# Patient Record
Sex: FEMALE | Race: BLACK OR AFRICAN AMERICAN | NOT HISPANIC OR LATINO | Employment: FULL TIME | URBAN - METROPOLITAN AREA
[De-identification: names, ages, dates, MRNs, and addresses within clinical notes are randomized per-mention and may not be internally consistent; named-entity substitution may affect disease eponyms.]

---

## 2024-11-16 ENCOUNTER — HOSPITAL ENCOUNTER (EMERGENCY)
Facility: HOSPITAL | Age: 24
Discharge: HOME | End: 2024-11-16
Attending: EMERGENCY MEDICINE

## 2024-11-16 VITALS
DIASTOLIC BLOOD PRESSURE: 72 MMHG | TEMPERATURE: 97.9 F | HEART RATE: 80 BPM | HEIGHT: 63 IN | BODY MASS INDEX: 21.44 KG/M2 | WEIGHT: 121 LBS | SYSTOLIC BLOOD PRESSURE: 118 MMHG | RESPIRATION RATE: 18 BRPM | OXYGEN SATURATION: 99 %

## 2024-11-16 DIAGNOSIS — N90.89 CLITORAL IRRITATION: Primary | ICD-10-CM

## 2024-11-16 PROCEDURE — 99283 EMERGENCY DEPT VISIT LOW MDM: CPT

## 2024-11-16 PROCEDURE — 2500000002 HC RX 250 W HCPCS SELF ADMINISTERED DRUGS (ALT 637 FOR MEDICARE OP, ALT 636 FOR OP/ED)

## 2024-11-16 RX ORDER — SULFAMETHOXAZOLE AND TRIMETHOPRIM 800; 160 MG/1; MG/1
1 TABLET ORAL ONCE
Status: COMPLETED | OUTPATIENT
Start: 2024-11-16 | End: 2024-11-16

## 2024-11-16 RX ORDER — SULFAMETHOXAZOLE AND TRIMETHOPRIM 800; 160 MG/1; MG/1
1 TABLET ORAL EVERY 12 HOURS
Qty: 10 TABLET | Refills: 0 | Status: SHIPPED | OUTPATIENT
Start: 2024-11-16 | End: 2024-11-21

## 2024-11-16 RX ADMIN — SULFAMETHOXAZOLE AND TRIMETHOPRIM 1 TABLET: 800; 160 TABLET ORAL at 15:11

## 2024-11-16 ASSESSMENT — PAIN DESCRIPTION - FREQUENCY: FREQUENCY: CONSTANT/CONTINUOUS

## 2024-11-16 ASSESSMENT — PAIN DESCRIPTION - ONSET: ONSET: GRADUAL

## 2024-11-16 ASSESSMENT — PAIN DESCRIPTION - PROGRESSION: CLINICAL_PROGRESSION: GRADUALLY WORSENING

## 2024-11-16 ASSESSMENT — PAIN DESCRIPTION - LOCATION: LOCATION: VAGINA

## 2024-11-16 ASSESSMENT — COLUMBIA-SUICIDE SEVERITY RATING SCALE - C-SSRS
2. HAVE YOU ACTUALLY HAD ANY THOUGHTS OF KILLING YOURSELF?: NO
1. IN THE PAST MONTH, HAVE YOU WISHED YOU WERE DEAD OR WISHED YOU COULD GO TO SLEEP AND NOT WAKE UP?: NO
6. HAVE YOU EVER DONE ANYTHING, STARTED TO DO ANYTHING, OR PREPARED TO DO ANYTHING TO END YOUR LIFE?: NO

## 2024-11-16 ASSESSMENT — PAIN - FUNCTIONAL ASSESSMENT: PAIN_FUNCTIONAL_ASSESSMENT: 0-10

## 2024-11-16 ASSESSMENT — PAIN DESCRIPTION - DESCRIPTORS: DESCRIPTORS: THROBBING;PRESSURE

## 2024-11-16 ASSESSMENT — PAIN DESCRIPTION - ORIENTATION: ORIENTATION: RIGHT

## 2024-11-16 ASSESSMENT — PAIN SCALES - GENERAL: PAINLEVEL_OUTOF10: 7

## 2024-11-16 ASSESSMENT — PAIN DESCRIPTION - PAIN TYPE: TYPE: ACUTE PAIN

## 2024-11-16 NOTE — ED TRIAGE NOTES
"Patient presents to ED from home for a lump in her vaginal area for 2 days that causes pain. Patient attempted to squeeze the abscess and felt a \"pop\" but nothing came out.   "

## 2024-11-16 NOTE — ED PROVIDER NOTES
HPI   Chief Complaint   Patient presents with    Abscess       24-year-old female otherwise healthy presents the ED today with a chief concern of vaginal pain.  Patient reports symptoms started about 2 days ago.  She reports the pain is located in the right clitoral area.  She reports that she popped it and tried to squeeze it however only a little bit of clear fluid came out.  She denies any fevers.  She denies any nausea or vomiting.  She denies any chest pain or shortness of breath.  She denies any abdominal pain.  She adamantly denies any chance of pregnancy.  Was sexually active a couple months ago.  Denies urinary symptoms.  She has no other symptoms or concerns at this time.      History provided by:  Patient   used: No            Patient History   No past medical history on file.  No past surgical history on file.  No family history on file.  Social History     Tobacco Use    Smoking status: Not on file    Smokeless tobacco: Not on file   Substance Use Topics    Alcohol use: Not on file    Drug use: Not on file       Physical Exam   ED Triage Vitals [11/16/24 1359]   Temperature Heart Rate Respirations BP   36.6 °C (97.9 °F) 79 18 124/87      Pulse Ox Temp Source Heart Rate Source Patient Position   100 % Temporal Monitor Sitting      BP Location FiO2 (%)     Right arm --       Physical Exam  Constitutional: Vital signs per nursing notes.  Well developed, well nourished.  No acute distress.    Eyes: onjunctivae and lids normal  ENT: Ears normal externally; face symmetric. voice normal  Neck: neck supple, no meningismus; trachea midline without deviation  Respiratory: normal respiratory effort and excursion; no rales, rhonchi, or wheezes; equal air entry  Cardiovascular: RRR, 2+ pulses extremities   Neurological: normal speech; CN II-XII grossly intact, normal motor and sensory function  GI: no distention, soft, nontender  : There is a dime to quarter sized area of erythema on the right  side of the clitoris.  There is no surrounding erythema.  There is no lymphangitic streaking.  There is no crepitus.  No obvious abscess.  No Bartholin cyst or abscess.  Small amount of white vaginal discharge.  No cervical motion tenderness.  Musculoskeletal: normal gait and station; normal digits and nails; normal to palpation; normal strength/tone; neurovascular status intact.  Skin: normal to inspection; normal to palpation; no rash      ED Course & MDM   ED Course as of 11/16/24 1504   Sat Nov 16, 2024   1500 Spoke with Dr. Bose from GYN who states we can start the patient on Bactrim and have her follow-up in the clinic outpatient [MC]      ED Course User Index  [MC] Koby Serna PA-C         Diagnoses as of 11/16/24 1504   Clitoral irritation                 No data recorded     [place] Coma Scale Score: 15 (11/16/24 1401 : Yani Griffin RN)                           Medical Decision Making  24-year-old female otherwise healthy presents the ED today with a chief concern of vaginal pain.  Vital signs reassuring.  Patient overall appears well and is nontoxic-appearing.  Was given Bactrim in the ED.  Spoke with gynecology as noted above.  They recommended starting patient on Bactrim.  She was given her first dose in the ED and I sent the rest of the antibiotic to her pharmacy.  Her examination is concerning for clitoral acevedo abscess versus cellulitis.  Will not drain anything at this time.  Will have patient see gynecology outpatient and started on Bactrim.  Discussed uses and possible side effects of this medication.  I have low suspicion for any deep space infection at this time.  Do not think further white blood work or imaging is indicated at this time.  She is not having any systemic signs or symptoms.  The area does not resemble Kirstie gangrene.  Discussed my impression and findings with patient she feels comfortable returning home.  We discussed very strict return precautions including returning  for any new or worsening signs or symptoms.  Patient is in agreement this plan.  She will follow-up with her PCP and gynecology within 3 days.  Again discussed return to precautions.  Patient was also seen and evaluated by attending physician.    Differential diagnosis: Clitoral acevedo cyst, abscess, Bartholin cyst, cellulitis, lymphangitis, necrotizing fasciitis    Disposition/treatment  1.  See diagnosis    Shared decision-making was used patient feels comfortable returning home     Patient was advised to follow up with recommended provider in 1 day1 for another evaluation and exam. I advised patient/guardian to return or go to closest emergency room immediately if symptoms change, get worse, new symptoms develop prior to follow up. If there is no improvement in symptoms in the next 24 hours they are advised to return for further evaluation and exam. I also explained the plan and treatment course. Patient/guardian is in agreement with plan, treatment course, and follow up and states verbally that they will comply.    Homegoing. I discussed the differential; results and discharge plan with the patient and/or family/friend/caregiver if present.  I emphasized the importance of follow-up with the physician I referred them to in the timeframe recommended.  I explained reasons for the patient to return to the Emergency Department. They agreed that if they feel their condition is worsening or if they have any other concern they should call 911 immediately for further assistance. I gave the patient an opportunity to ask all questions they had and answered all of them accordingly. They understand return precautions and discharge instructions. The patient and/or family/friend/caregiver expressed understanding verbally and that they would comply.        This note has been transcribed using voice recognition and may contain grammatical errors, misplaced words, incorrect words, incorrect phrases or other  errors.        Procedure  Procedures     Koby Serna PA-C  11/16/24 2700

## 2024-11-16 NOTE — DISCHARGE INSTRUCTIONS
Please return to the ED immediately if you have any new or worsening signs or symptoms  Please follow-up with your primary care doctor and gynecology within 3 days

## 2024-11-22 ENCOUNTER — APPOINTMENT (OUTPATIENT)
Dept: OBSTETRICS AND GYNECOLOGY | Facility: CLINIC | Age: 24
End: 2024-11-22

## 2024-12-04 ENCOUNTER — APPOINTMENT (OUTPATIENT)
Dept: OBSTETRICS AND GYNECOLOGY | Facility: CLINIC | Age: 24
End: 2024-12-04

## 2024-12-11 ENCOUNTER — APPOINTMENT (OUTPATIENT)
Dept: OBSTETRICS AND GYNECOLOGY | Facility: CLINIC | Age: 24
End: 2024-12-11

## 2024-12-18 ENCOUNTER — APPOINTMENT (OUTPATIENT)
Dept: OBSTETRICS AND GYNECOLOGY | Facility: CLINIC | Age: 24
End: 2024-12-18

## 2024-12-31 ENCOUNTER — HOSPITAL ENCOUNTER (EMERGENCY)
Facility: HOSPITAL | Age: 24
Discharge: HOME | End: 2024-12-31
Payer: MEDICAID

## 2024-12-31 VITALS
HEART RATE: 86 BPM | TEMPERATURE: 97.5 F | DIASTOLIC BLOOD PRESSURE: 91 MMHG | SYSTOLIC BLOOD PRESSURE: 129 MMHG | WEIGHT: 122 LBS | BODY MASS INDEX: 21.62 KG/M2 | RESPIRATION RATE: 18 BRPM | OXYGEN SATURATION: 100 % | HEIGHT: 63 IN

## 2024-12-31 DIAGNOSIS — N76.4 ABSCESS OF RIGHT GENITAL LABIA: Primary | ICD-10-CM

## 2024-12-31 PROCEDURE — 56405 I&D VULVA/PERINEAL ABSCESS: CPT

## 2024-12-31 PROCEDURE — 99283 EMERGENCY DEPT VISIT LOW MDM: CPT | Mod: 25

## 2024-12-31 RX ORDER — CEPHALEXIN 500 MG/1
500 CAPSULE ORAL 3 TIMES DAILY
Qty: 21 CAPSULE | Refills: 0 | Status: SHIPPED | OUTPATIENT
Start: 2024-12-31 | End: 2025-01-07

## 2024-12-31 RX ORDER — SULFAMETHOXAZOLE AND TRIMETHOPRIM 800; 160 MG/1; MG/1
1 TABLET ORAL 2 TIMES DAILY
Qty: 14 TABLET | Refills: 0 | Status: SHIPPED | OUTPATIENT
Start: 2024-12-31 | End: 2025-01-07

## 2024-12-31 ASSESSMENT — COLUMBIA-SUICIDE SEVERITY RATING SCALE - C-SSRS
2. HAVE YOU ACTUALLY HAD ANY THOUGHTS OF KILLING YOURSELF?: NO
6. HAVE YOU EVER DONE ANYTHING, STARTED TO DO ANYTHING, OR PREPARED TO DO ANYTHING TO END YOUR LIFE?: NO
1. IN THE PAST MONTH, HAVE YOU WISHED YOU WERE DEAD OR WISHED YOU COULD GO TO SLEEP AND NOT WAKE UP?: NO

## 2024-12-31 ASSESSMENT — PAIN DESCRIPTION - PAIN TYPE: TYPE: ACUTE PAIN

## 2024-12-31 ASSESSMENT — PAIN - FUNCTIONAL ASSESSMENT: PAIN_FUNCTIONAL_ASSESSMENT: 0-10

## 2024-12-31 ASSESSMENT — PAIN SCALES - GENERAL: PAINLEVEL_OUTOF10: 7

## 2024-12-31 ASSESSMENT — PAIN DESCRIPTION - LOCATION: LOCATION: VAGINA

## 2024-12-31 NOTE — ED PROVIDER NOTES
HPI   Chief Complaint   Patient presents with    Cyst       History of present illness: 24-year-old female complains of a cyst in her right groin for the last few days.  Patient states that she normally gets a pimple on her skin when her menstrual cycle begins.  Patient states there is increased swelling and pus at that area draining.  Patient had a similar issue on her vagina in November 2024 on her clitoris.  Denies abdominal pain nausea vomiting fevers chills sweats lightheadedness dizziness.  Patient states that she recently shaved her vagina.    Review of systems: Constitutional, eye, ENT, cardiovascular, respiratory, gastrointestinal, genitourinary, neurologic, musculoskeletal, dermatologic, hematologic, endocrine systems were evaluated and were negative unless otherwise specified in history of present illness.    Medications: Reviewed and per nursing note.    Family history: Denies relevant medical conditions.    Past medical history: None per patient    Social history: Denies tobacco, alcohol, drug use.      Physical exam:    Appearance: Well-developed, well-nourished, nontoxic-appearing, alert and oriented x3. Talking in complete sentences.    HEENT:  Head normocephalic atraumatic, extraocular movements intact, pupils equal round reactive to light, mucous membranes are moist and pink.    NECK:  Nml Inspection, no meningismus, no thyromegaly, no lymphadenopathy, no JVD, trachea is midline.    Respiratory: Clear to auscultation bilaterally with normal bilateral excursion. No wheezes, rhonchi, crackles.    Cardiovascular: Regular rate and rhythm, no murmurs, rubs or gallops. Pulses 2+ symmetrically in the dorsalis pedis and radial pulses.    Abdomen/GI:  Soft, nontender, nondistended, normal bowel sounds x4. No masses or organomegaly.    :  No CVA tenderness    Genital: 1 cm indurated lesion with minimal fluctuance on right labia with right inguinal lymphadenopathy it is mobile and tender.  Internal exam  deferred.    Neuro:  Oriented x 3, Speech Clear, cranial nerves grossly intact. Normal sensation to light touch in all 4 extremities.    Musculoskeletal: Patient spontaneously moves all 4 extremities.    Skin:  No cyanosis, clubbing, edema, open wounds, or rashes.            Patient History   Past Medical History:   Diagnosis Date    Asthma      History reviewed. No pertinent surgical history.  No family history on file.  Social History     Tobacco Use    Smoking status: Never    Smokeless tobacco: Never   Substance Use Topics    Alcohol use: Not on file    Drug use: Not on file       Physical Exam   ED Triage Vitals [12/31/24 1353]   Temperature Heart Rate Respirations BP   36.4 °C (97.5 °F) 86 18 (!) 129/91      Pulse Ox Temp Source Heart Rate Source Patient Position   100 % Oral Monitor Sitting      BP Location FiO2 (%)     Right arm --       Physical Exam      ED Course & MDM   Diagnoses as of 12/31/24 1533   Abscess of right genital labia                 No data recorded                                 Medical Decision Making  24-year-old female with pain in right labia.  Differential diagnosis of abscess cellulitis herpes lymphadenopathy cervicitis PID.  Examination shows right labial abscess.  Patient was previously squeezing and draining it.  Minimal fluctuance.  Patient was given option of drainage and she requested needle aspiration drainage.  This was performed with limited removal of purulent bloody fluid.  Patient afebrile nontoxic-appearing without evidence of complication such as sepsis or PID.  Patient will be treated with Bactrim Keflex for abscess.    Incision and drainage:    Abscess incision and drainage was performed by physician assistant. Site was cleansed with alcohol swab.  18-gauge needle infiltrated the primary site of fluctuance at the abscess site.  With aspiration minimal amount of bloody purulent fluid was removed with subsequent pressure.  Dry sterile dressing was applied. Patient  tolerated procedure well.    Patient will be discharged to home with prescription for Bactrim Keflex.  Patient is educated in signs and symptoms of worsening symptoms and reasons to come back to the emergency department.  Will need to follow up with primary care provider.  Patient does not report social determinants of health impacting ability to obtain care that is needed.  Patient agrees with plan.    This is a transcription.  Text was reviewed for errors, but some transcription errors may remain.  Please call for any questions.          Procedure  Procedures     Nolberto Castañeda PA-C  12/31/24 1537

## 2025-01-08 ENCOUNTER — APPOINTMENT (OUTPATIENT)
Dept: OBSTETRICS AND GYNECOLOGY | Facility: CLINIC | Age: 25
End: 2025-01-08

## 2025-01-15 ENCOUNTER — APPOINTMENT (OUTPATIENT)
Dept: OBSTETRICS AND GYNECOLOGY | Facility: CLINIC | Age: 25
End: 2025-01-15
Payer: MEDICAID

## 2025-01-24 ENCOUNTER — APPOINTMENT (OUTPATIENT)
Dept: OBSTETRICS AND GYNECOLOGY | Facility: CLINIC | Age: 25
End: 2025-01-24
Payer: MEDICAID

## 2025-01-24 VITALS — DIASTOLIC BLOOD PRESSURE: 78 MMHG | SYSTOLIC BLOOD PRESSURE: 115 MMHG

## 2025-01-24 DIAGNOSIS — N76.4 VULVAR ABSCESS: ICD-10-CM

## 2025-01-24 DIAGNOSIS — Z11.3 SCREEN FOR STD (SEXUALLY TRANSMITTED DISEASE): Primary | ICD-10-CM

## 2025-01-24 DIAGNOSIS — Z12.4 ENCOUNTER FOR PAPANICOLAOU SMEAR OF CERVIX: ICD-10-CM

## 2025-01-24 PROCEDURE — 88175 CYTOPATH C/V AUTO FLUID REDO: CPT

## 2025-01-24 PROCEDURE — 87491 CHLMYD TRACH DNA AMP PROBE: CPT

## 2025-01-24 PROCEDURE — 87205 SMEAR GRAM STAIN: CPT

## 2025-01-24 PROCEDURE — 87075 CULTR BACTERIA EXCEPT BLOOD: CPT

## 2025-01-24 PROCEDURE — 87661 TRICHOMONAS VAGINALIS AMPLIF: CPT

## 2025-01-24 PROCEDURE — 87591 N.GONORRHOEAE DNA AMP PROB: CPT

## 2025-01-24 PROCEDURE — 87185 SC STD ENZYME DETCJ PER NZM: CPT

## 2025-01-24 PROCEDURE — 87070 CULTURE OTHR SPECIMN AEROBIC: CPT

## 2025-01-24 RX ORDER — ALBUTEROL SULFATE 90 UG/1
2 INHALANT RESPIRATORY (INHALATION) 4 TIMES DAILY
COMMUNITY
Start: 2016-11-05

## 2025-01-24 RX ORDER — LIDOCAINE HYDROCHLORIDE 20 MG/ML
3 INJECTION, SOLUTION INFILTRATION; PERINEURAL ONCE
Status: COMPLETED | OUTPATIENT
Start: 2025-01-24 | End: 2025-01-24

## 2025-01-24 RX ORDER — SULFAMETHOXAZOLE AND TRIMETHOPRIM 800; 160 MG/1; MG/1
1 TABLET ORAL 2 TIMES DAILY
Qty: 10 TABLET | Refills: 0 | Status: SHIPPED | OUTPATIENT
Start: 2025-01-24 | End: 2025-01-31

## 2025-01-24 RX ADMIN — LIDOCAINE HYDROCHLORIDE 3 ML: 20 INJECTION, SOLUTION INFILTRATION; PERINEURAL at 09:41

## 2025-01-24 NOTE — PROGRESS NOTES
"Subjective   Patient ID: Celestine Hagan \"Elli" is a 24 y.o. female who presents for New Patient Visit (Patient was seen in ED 12/31/24 for boil on right side pelvis area. The doctor gave her medication that was taken until she ran out. Patient states that since she's taken medication and used heating pad the boil went down. The boil is small but still has yellow/clear fluid coming out of it. ).  Here for ED follow-up after I&D for right vulvar abscess.  Patient was sent out with Keflex and Bactrim antibiotics x 7 days.  Denies fevers this pain is improved substantially does report that she is having some drainage today        Review of Systems   All other systems reviewed and are negative.      Objective   Physical Exam  Vitals reviewed. Exam conducted with a chaperone present.   Constitutional:       General: She is not in acute distress.     Appearance: She is not ill-appearing.   Pulmonary:      Effort: Pulmonary effort is normal.   Genitourinary:     General: Normal vulva.      Exam position: Lithotomy position.      Vagina: No signs of injury and foreign body. No vaginal discharge, erythema, tenderness, bleeding, lesions or prolapsed vaginal walls.      Cervix: No discharge, friability, lesion, erythema or cervical bleeding.          Comments: 1 to 2 cm over lesion deep to the skin in the right mons    Well-healing I&D site on the right labia  Skin:     Coloration: Skin is not pale.   Neurological:      Mental Status: She is alert.         Assessment/Plan   Diagnoses and all orders for this visit:  Vulvar abscess  -     Referral to Gynecology  -     THINPREP PAP TEST  -     C. trachomatis / N. gonorrhoeae, Amplified  -     Trichomonas vaginalis, Amplified  -     Tissue/Wound Culture/Smear  -     sulfamethoxazole-trimethoprim (Bactrim DS) 800-160 mg tablet; Take 1 tablet by mouth 2 times a day for 7 days.    Patient here for ED follow-up after I&D for right vulvar abscess.  Patient still with significant " "vulvar lesion consistent abscess I&D performed as below.  Speculum exam performed to screen for sexually transmitted infections and performed Pap smear.  Will send prescription for Bactrim.  Patient to follow-up as needed    Patient ID: Celestine Hagan \"Patricio\" is a 24 y.o. female.    Procedures       Denilson Daniel MD 01/24/25 9:28 AM   "

## 2025-01-24 NOTE — PROGRESS NOTES
"Patient ID: Celestine Hagan \"Elli" is a 24 y.o. female.    Procedures    Incision and drainage    Area was prepped with Betadine and abscess was anesthetized using 2% plain lidocaine.  A 5 mm linear incision was made over the abscess and minimal bloody fluid drained.  Wound culture was obtained.  The wound was loculations were broken up.  Pressure and gauze were applied.  "

## 2025-01-25 LAB
C TRACH RRNA SPEC QL NAA+PROBE: NEGATIVE
N GONORRHOEA DNA SPEC QL PROBE+SIG AMP: NEGATIVE
T VAGINALIS RRNA SPEC QL NAA+PROBE: NEGATIVE

## 2025-01-26 LAB
B-LACTAMASE ORGANISM ISLT: POSITIVE
BACTERIA SPEC CULT: NORMAL
BACTERIA SPEC CULT: NORMAL
GRAM STN SPEC: NORMAL
GRAM STN SPEC: NORMAL

## 2025-02-05 LAB
CYTOLOGY CMNT CVX/VAG CYTO-IMP: NORMAL
LAB AP HPV GENOTYPE QUESTION: YES
LAB AP HPV HR: NORMAL
LABORATORY COMMENT REPORT: NORMAL
LMP START DATE: NORMAL
PATH REPORT.TOTAL CANCER: NORMAL

## 2025-02-06 ENCOUNTER — APPOINTMENT (OUTPATIENT)
Dept: PRIMARY CARE | Facility: CLINIC | Age: 25
End: 2025-02-06

## 2025-02-14 ENCOUNTER — HOSPITAL ENCOUNTER (EMERGENCY)
Facility: HOSPITAL | Age: 25
Discharge: HOME | End: 2025-02-14
Payer: MEDICAID

## 2025-02-14 VITALS
DIASTOLIC BLOOD PRESSURE: 93 MMHG | TEMPERATURE: 97.8 F | RESPIRATION RATE: 16 BRPM | HEART RATE: 72 BPM | WEIGHT: 125 LBS | BODY MASS INDEX: 22.15 KG/M2 | SYSTOLIC BLOOD PRESSURE: 119 MMHG | HEIGHT: 63 IN | OXYGEN SATURATION: 100 %

## 2025-02-14 DIAGNOSIS — T15.91XA FOREIGN BODY, EYE, RIGHT, INITIAL ENCOUNTER: ICD-10-CM

## 2025-02-14 DIAGNOSIS — H11.421 CHEMOSIS OF CONJUNCTIVA, RIGHT: Primary | ICD-10-CM

## 2025-02-14 PROCEDURE — 2500000005 HC RX 250 GENERAL PHARMACY W/O HCPCS: Performed by: NURSE PRACTITIONER

## 2025-02-14 PROCEDURE — 99283 EMERGENCY DEPT VISIT LOW MDM: CPT

## 2025-02-14 RX ORDER — ERYTHROMYCIN 5 MG/G
OINTMENT OPHTHALMIC NIGHTLY
Qty: 1 G | Refills: 0 | Status: SHIPPED | OUTPATIENT
Start: 2025-02-14 | End: 2025-02-21

## 2025-02-14 RX ADMIN — FLUORESCEIN SODIUM 1 STRIP: 1 STRIP OPHTHALMIC at 10:18

## 2025-02-14 ASSESSMENT — VISUAL ACUITY
OS: 20/25
OD: 20/25
OU: 20/25

## 2025-02-14 ASSESSMENT — PAIN SCALES - GENERAL: PAINLEVEL_OUTOF10: 1

## 2025-02-14 ASSESSMENT — PAIN - FUNCTIONAL ASSESSMENT: PAIN_FUNCTIONAL_ASSESSMENT: 0-10

## 2025-02-14 ASSESSMENT — TONOMETRY
OD_IOP_MMHG: 16
IOP_HANDHELD: 1

## 2025-02-14 ASSESSMENT — COLUMBIA-SUICIDE SEVERITY RATING SCALE - C-SSRS
6. HAVE YOU EVER DONE ANYTHING, STARTED TO DO ANYTHING, OR PREPARED TO DO ANYTHING TO END YOUR LIFE?: NO
1. IN THE PAST MONTH, HAVE YOU WISHED YOU WERE DEAD OR WISHED YOU COULD GO TO SLEEP AND NOT WAKE UP?: NO
2. HAVE YOU ACTUALLY HAD ANY THOUGHTS OF KILLING YOURSELF?: NO

## 2025-02-14 NOTE — ED TRIAGE NOTES
Pt states she has a foreign object in her eye. Pt's eye is very red and swollen. Pt is awake, alert, and ambulatory upon arrival.

## 2025-02-14 NOTE — Clinical Note
JoseJakob Hagan was seen and treated in our emergency department on 2/14/2025.  She may return to work on 02/15/2025.       If you have any questions or concerns, please don't hesitate to call.      Jennifer Henry V, APRN-CNP

## 2025-02-14 NOTE — ED PROVIDER NOTES
"HPI     CC: Foreign Body in Eye     HPI: Celestine Hagan is a 24 y.o. female with past medical history of asthma presents with complaints of right eye discomfort that started around 8 AM this morning.  She endorses associated redness and swelling.  She reports her cat was sitting in the windowsill when he jumped off something flew off of his paws and felt like it hit her in the eye.  She denies sensation of sand or grit in the eye.  She denies pain.  She denies change in vision.  She denies drainage.  Denies fevers or chills.    ROS: 10-point review of systems was performed and is otherwise negative except as noted in HPI.      Past Medical History: Noncontributory except per HPI     Past Surgical History: Noncontributory except per HPI     Family History: Reviewed and noncontributory     Social History:  Noncontributory except per HPI       No Known Allergies    Past Medical History:   Diagnosis Date    Asthma        Home Meds:   Current Outpatient Medications   Medication Instructions    albuterol (ProAir HFA) 90 mcg/actuation inhaler 2 puffs, 4 times daily    erythromycin (Romycin) 5 mg/gram (0.5 %) ophthalmic ointment Right Eye, Nightly, Apply Amount per Dose: 0.5 inch (~1 cm) per dose.        ED Triage Vitals [02/14/25 0909]   Temperature Heart Rate Respirations BP   36.6 °C (97.8 °F) 72 16 (!) 119/93      Pulse Ox Temp src Heart Rate Source Patient Position   100 % -- -- --      BP Location FiO2 (%)     -- --         Heart Rate:  [72]   Temperature:  [36.6 °C (97.8 °F)]   Respirations:  [16]   BP: (119)/(93)   Height:  [160 cm (5' 3\")]   Weight:  [56.7 kg (125 lb)]   Pulse Ox:  [100 %]      Physical Exam:  Physical Exam  Vitals and nursing note reviewed.   Constitutional:       General: She is not in acute distress.     Appearance: She is well-developed.   HENT:      Head: Normocephalic and atraumatic.   Eyes:      General:         Right eye: No foreign body or discharge.         Left eye: Discharge " present.No foreign body.      Intraocular pressure: Right eye pressure is 16 mmHg. Measurements were taken using a handheld tonometer.     Extraocular Movements: Extraocular movements intact.      Conjunctiva/sclera: Conjunctivae normal.      Right eye: Chemosis present. No exudate.  Cardiovascular:      Rate and Rhythm: Normal rate and regular rhythm.      Heart sounds: No murmur heard.  Pulmonary:      Effort: Pulmonary effort is normal. No respiratory distress.      Breath sounds: Normal breath sounds.   Abdominal:      Palpations: Abdomen is soft.      Tenderness: There is no abdominal tenderness.   Musculoskeletal:         General: No swelling.      Cervical back: Neck supple.   Skin:     General: Skin is warm and dry.      Capillary Refill: Capillary refill takes less than 2 seconds.   Neurological:      Mental Status: She is alert.   Psychiatric:         Mood and Affect: Mood normal.          Diagnostic Results        Labs Reviewed - No data to display      No orders to display                 [place] Coma Scale Score: 15                  Procedure  Procedures    ED Course & MDM   Assessment/Plan:     Medications   fluorescein 1 mg ophthalmic strip 1 strip (1 strip Right Eye Given 2/14/25 1018)        Diagnoses as of 02/14/25 1750   Chemosis of conjunctiva, right   Foreign body, eye, right, initial encounter       Medical Decision Making    CucoFifiJakob Hagan is a 24 y.o. female independent historian presents with complaints of right eye discomfort, redness and swelling that started around 8 AM this morning.  Apparently her cat  jumped off the windowsill and something flew off of his paws and hit her in the eye.  She reports she feels a bump that is noticeable whenever she moves her eye. She denies pain, change in vision, drainage.      Differential diagnosis Chemosis, foreign body, iritis, conjunctivitis, corneal abrasion, glaucoma    On exam she is alert and oriented X3, NAD noted. Afebrile, VSS.  PERRL.   Right conjunctiva not injected. Chemosis right lateral eye.  No abrasion appreciated on slit-lamp exam.  No foreign body.  Pressure 16 right eye.    See diagnosis  I discussed findings with patient and they are safe for discharge and outpatient treatment.  She is advised to follow up with Ophthalmologist within two days for further evaluation and management of care    Referral for Ophthalmologist placed. Stop at the  on the way out and schedule an appointment.    Prescription erythromycin sent to pharmacy. Take medications as prescribed     Follow up instructions discussed. ED precautions discussed and advised to return to ED if symptoms worsen or persist for follow up.    Counseling: Spoke with the patient and discussed today´s findings, in addition to providing specific details for the plan of care and expected course. Patient was given the opportunity to ask questions     She expressed understanding was agreeable with plan and discharge at this time. Discharged in hemodynamically stable condition.                Disposition: Home    ED Prescriptions       Medication Sig Dispense Start Date End Date Auth. Provider    erythromycin (Romycin) 5 mg/gram (0.5 %) ophthalmic ointment Apply to right eye once daily at bedtime for 7 days. Apply Amount per Dose: 0.5 inch (~1 cm) per dose. 1 g 2/14/2025 2/21/2025 NICOLASA Vaughn-CNP            Social Determinants Affecting Care: none     NICOLASA Arreguin-CNP    This note was dictated by speech recognition. Minor errors in transcription may be present.     KRISSY Vaughn  02/14/25 4018

## 2025-02-14 NOTE — DISCHARGE INSTRUCTIONS
Follow up with Ophthalmologist within two days for further evaluation and management of care    Referral for Ophthalmologist placed. Stop at the  on the way out and schedule an appointment.    Follow up instructions discussed. ED precautions discussed and advised to return to ED if symptoms worsen or persist for follow up.

## 2025-02-20 ENCOUNTER — APPOINTMENT (OUTPATIENT)
Dept: OPHTHALMOLOGY | Age: 25
End: 2025-02-20
Payer: MEDICAID

## 2025-05-29 ENCOUNTER — APPOINTMENT (OUTPATIENT)
Dept: OBSTETRICS AND GYNECOLOGY | Facility: CLINIC | Age: 25
End: 2025-05-29
Payer: MEDICAID